# Patient Record
Sex: FEMALE | Race: WHITE | URBAN - METROPOLITAN AREA
[De-identification: names, ages, dates, MRNs, and addresses within clinical notes are randomized per-mention and may not be internally consistent; named-entity substitution may affect disease eponyms.]

---

## 2018-02-13 ENCOUNTER — OFFICE VISIT CONVERTED (OUTPATIENT)
Dept: PULMONOLOGY | Facility: CLINIC | Age: 83
End: 2018-02-13
Attending: PHYSICIAN ASSISTANT

## 2018-02-16 ENCOUNTER — OFFICE VISIT CONVERTED (OUTPATIENT)
Dept: CARDIOLOGY | Facility: CLINIC | Age: 83
End: 2018-02-16
Attending: INTERNAL MEDICINE

## 2018-03-16 ENCOUNTER — CONVERSION ENCOUNTER (OUTPATIENT)
Dept: CARDIOLOGY | Facility: CLINIC | Age: 83
End: 2018-03-16

## 2018-03-16 ENCOUNTER — OFFICE VISIT CONVERTED (OUTPATIENT)
Dept: CARDIOLOGY | Facility: CLINIC | Age: 83
End: 2018-03-16
Attending: INTERNAL MEDICINE

## 2018-04-26 ENCOUNTER — OFFICE VISIT CONVERTED (OUTPATIENT)
Dept: CARDIOLOGY | Facility: CLINIC | Age: 83
End: 2018-04-26
Attending: INTERNAL MEDICINE

## 2018-05-07 ENCOUNTER — OFFICE VISIT CONVERTED (OUTPATIENT)
Dept: PULMONOLOGY | Facility: CLINIC | Age: 83
End: 2018-05-07
Attending: INTERNAL MEDICINE

## 2021-05-16 VITALS
HEIGHT: 64 IN | SYSTOLIC BLOOD PRESSURE: 154 MMHG | WEIGHT: 124 LBS | BODY MASS INDEX: 21.17 KG/M2 | HEART RATE: 78 BPM | DIASTOLIC BLOOD PRESSURE: 60 MMHG

## 2021-05-16 VITALS
SYSTOLIC BLOOD PRESSURE: 154 MMHG | WEIGHT: 130 LBS | DIASTOLIC BLOOD PRESSURE: 74 MMHG | HEART RATE: 82 BPM | BODY MASS INDEX: 22.2 KG/M2 | HEIGHT: 64 IN

## 2021-05-16 VITALS
WEIGHT: 129 LBS | BODY MASS INDEX: 22.86 KG/M2 | SYSTOLIC BLOOD PRESSURE: 136 MMHG | HEIGHT: 63 IN | DIASTOLIC BLOOD PRESSURE: 66 MMHG | HEART RATE: 70 BPM

## 2021-05-28 VITALS
OXYGEN SATURATION: 89 % | TEMPERATURE: 97.8 F | WEIGHT: 126.31 LBS | RESPIRATION RATE: 16 BRPM | DIASTOLIC BLOOD PRESSURE: 64 MMHG | BODY MASS INDEX: 21.56 KG/M2 | SYSTOLIC BLOOD PRESSURE: 160 MMHG | HEART RATE: 90 BPM | HEIGHT: 64 IN

## 2021-05-28 VITALS
RESPIRATION RATE: 16 BRPM | DIASTOLIC BLOOD PRESSURE: 62 MMHG | HEIGHT: 63 IN | OXYGEN SATURATION: 96 % | BODY MASS INDEX: 22.68 KG/M2 | HEART RATE: 82 BPM | TEMPERATURE: 98 F | WEIGHT: 128 LBS | SYSTOLIC BLOOD PRESSURE: 175 MMHG

## 2021-05-28 NOTE — PROGRESS NOTES
Patient: CARA KEENE     Acct: [id number]     Report: #[id number]  UNIT #: [id number]     : 1934    Encounter Date:2018  PRIMARY CARE: Aakash Murphy  ***Signed***  --------------------------------------------------------------------------------------------------------------------  Chief Complaint      Encounter Date      May 7, 2018            Primary Care Provider      Aakash Murphy            Referring Provider      Екатерина Pearl            Patient Complaint      Patient is complaining of      Pt here for 2m f/u and results, pneumonia            VITALS      Height 5 ft 4 in / 162.56 cm      Weight 126 lbs 5 oz / 57.98088 kg      BSA 1.61 m2      BMI 21.7 kg/m2      Temperature 97.8 F / 36.56 C - Oral      Pulse 90      Respirations 16      Blood Pressure 160/64 Sitting, Right Arm      Pulse Oximetry 89%, Room air            HPI      The patient is a very pleasant 84 year old white female here today for follow     up.  The patient had a CT scan of his chest on 2018 that showed complete     resolution of the previously seen pneumonia.  She is doing well at this time     with complaints of no shortness of breath, not currently wearing any oxygen and     has no hemoptysis or any chest pains at this time.            ROS      Constitutional:  Complains of: Other (Pt states nothing is wrong), Denies:     Fatigue, Fever, Weight gain, Weight loss, Chills, Insomnia      Respiratory/Breathing:  Denies: Shortness of air, Wheezing, Cough, Hemoptysis,     Pleuritic pain, Other      Endocrine:  Denies: Polydipsia, Polyuria, Heat/cold intolerance, Abnorml     menstrual pattern, Diabetes, Other      Eyes:  Denies: Blurred vision, Vision Changes, Other      Ears, nose, mouth, throat:  Denies: Mouth lesions, Thrush, Throat pain,     Hoarseness, Allergies/Hay Fever, Post Nasal Drip, Headaches, Recent Head Injury    , Nose Bleeding, Neck Stiffness, Thyroid Mass, Hearing Loss, Ear Fullness, Dry      Mouth, Nasal or Sinus Pain, Dry Lips, Nasal discharge, Nasal congestion, Other      Cardiovascular:  Denies: Palpitations, Syncope, Claudication, Chest Pain, Wake     up Gasping for air, Leg Swelling, Irregular Heart Rate, Cyanosis, Dyspnea on     Exertion, Other      Gastrointestinal:  Denies: Nausea, Constipation, Diarrhea, Abdominal pain,     Vomiting, Difficulty Swallowing, Reflux/Heartburn, Dysphagia, Jaundice, Bloating    , Melena, Bloody stools, Other      Genitourinary:  Denies: Urinary frequency, Incontinence, Hematuria, Urgency,     Nocturia, Dysuria, Testicular problems, Other      Musculoskeletal:  Denies: Joint Pain, Joint Stiffness, Joint Swelling, Myalgias    , Other      Hematologic/lymphatic:  DENIES: Lymphadenopathy, Bruising, Bleeding tendencies,     Other      Neurological:  Denies: Headache, Numbness, Weakness, Seizures, Other      Psychiatric:  Denies: Anxiety, Appropriate Effect, Depression, Other      Sleep:  No: Excessive daytime sleep, Morning Headache?, Snoring, Insomnia?,     Stop breathing at sleep?, Other      Integumentary:  Denies: Rash, Dry skin, Skin Warm to Touch, Other      Immunologic/Allergic:  Denies: Latex allergy, Seasonal allergies, Asthma,     Urticaria, Eczema, Other      Immunization status:  No: Up to date            FAMILY/SOCIAL/MEDICAL HX      Surgical History:  No: Abdominal Surgery, Appendectomy, Bladder Surgery, Bowel     Surgery, CABG, Cholecystectomy, Head Surgery, Oral Surgery, Orthopedic Surgery,     Vascular Surgery      Stroke - Family Hx:  Sister      Heart - Family Hx:  Father      Diabetes - Family Hx:  Brother      Cancer/Type - Family Hx:  Mother, Sister      Is Father Still Living?:  No      Is Mother Still Living?:  No      Smoking status:  Never smoker      Anticoagulation Therapy:  Yes      Antibiotic Prophylaxis:  No      Medical History:  Yes: Congestive Heart Failu (pt states dx in ER ), Diabetes,     High Blood Pressure, Miscellaneous  Medical/oth, No: Arthritis, Asthma, Blood     Disease, Chemotherapy/Cancer, Chronic Bronchitis/COPD, Deafness or Ringing Ears    , Seizures, Heart Attack, Hemorrhoids/Rectal Prob, Shortness Of Breath      Psychiatric History      None            PREVENTION      Hx Influenza Vaccination:  Yes      Date Influenza Vaccine Given:  Jan 1, 2018      Influenza Vaccine Declined:  Yes      2 or More Falls Past Year?:  No      Fall Past Year with Injury?:  No      Hx Pneumococcal Vaccination:  Yes      Encouraged to follow-up with:  PCP regarding preventative exams.      Chart initiated by      Mellisa Greene MA            ALLERGIES/MEDICATIONS      Allergies:        Coded Allergies:             NO KNOWN DRUG ALLERGIES (Verified  Allergy, Unknown, 5/7/18)      Medications    Last Reconciled on 5/7/18 10:23 by NIMCO GREEN MD      Vancomycin HCl (Vancomycin HCl) 125 Mg Cap      125 MG PO Q6HR, #40 CAP 0 Refills         Prov: Bahman Juarez         4/8/18       Furosemide (Furosemide) 40 Mg Tablet      40 MG PO QDAY for 30 Days, #30 TAB         Prov: Екатерина Pearl         2/2/18       Lisinopril* (Lisinopril*) 20 Mg Tablet      40 MG PO QDAY for 30 Days, #60 TAB         Prov: Екатерина Pearl         2/2/18       Insulin Detemir (Levemir VIAL*) 100 Unit/1 Ml Vial      13 UNITS SUBQ HS, #1 VIAL 0 Refills         Prov: Екатерина Pearl         2/2/18       Amiodarone Hcl (Amiodarone) 200 Mg Tablet      200 MG PO QDAY, #30 TAB 5 Refills         Prov: MOY SPIVEY         1/31/18       Metoprolol Succinate (Toprol XL*) 100 Mg Tab.er.24h      100 MG PO QDAY, #30 TAB 5 Refills         Prov: MOY SPIVEY         1/31/18       Apixaban (Eliquis) 2.5 Mg Tablet      2.5 MG PO BID for 30 Days, #60 TAB 5 Refills         Prov: MOY SPIVEY         1/31/18       Repaglinide (Prandin) 2 Mg Tablet      2 MG PO BID, #60 TAB 0 Refills         Reported         1/24/18       B Complex   1 EACH PO QDAY for 30 Days, CAP         Reported         1/24/18        Multivitamin/Iron/Folic Acid (Centrum Complete Multivitamin*) 1 Tab Tablet      1 TAB PO QDAY, #30 TAB 0 Refills         Reported         1/24/18       Aspirin (Aspirin Baby *) 81 Mg Tab.chew      81 MG PO HS, #30 TAB.CHEW 0 Refills         Reported         1/24/18       Gabapentin (Neurontin) 300 Mg Capsule      300 MG PO BID, CAP 0 Refills         Reported         10/16/09       Atorvastatin Calcium (Lipitor*) 20 Mg Tablet      20 MG PO HS, TAB 0 Refills         Reported         10/14/09       amLODIPine (Norvasc) 10 Mg Tablet      10 MG PO QDAY, TAB 0 Refills         Reported         10/14/09       Metformin Hcl (Glucophage*) 500 Mg Tablet      500 TAB PO BID, 0 Refills         Reported         10/14/09       Raloxifene HCl (Evista) 60 Mg Tab      60 MG PO QDAY, TAB 0 Refills         Reported         10/14/09      Current Medications      Current Medications Reviewed 5/7/18            EXAM      GEN-patient appears stated age resting comfortable in no acute distress      Eyes-PERRL,  conjunctiva are normal in appearance extraocular muscles are intact    , no scleral icterus      Nasal-both nares are patent turbinates appear normal no polyps seen no nasal     discharge or ulcerations      Ears-tympanic membranes are normal no erythema no bulging, normal to inspection      Lymphatic-no swollen or enlarged cervical nodes, or axillary node, or femoral     nodes, or supraclavicular nodes      Mouth normal dentition, no erythema no ulcerations oropharynx appears normal no     exudate no evidence of postnasal drip, MP(default value)      Neck-there are no palpable supraclavicular or cervical adenopathy, thyroid is     normal in appearance no apparent nodules, there is no inspiratory or expiratory     stridor      Respiratory-lungs are grossly clear to auscultation bilaterally.  No wheezes,     rhonchi or crackles appreciated.  Bilateral lower extremities are warm and dry     with +1 pitting edema.         Cardiovascular-the heart rate is normal and regular S1 and S2 present with no     murmur or extra heart sounds, there is no JVD or pedal edema present      GI-the abdomen is normal in appearance, bowel sounds present and normal in all     quadrants no hepatosplenomegaly or masses felt      Extremities-no clubbing is present, pulses present in all extremities,     capillary refill time is normal      Musculoskeletal-Normal strength in upper and lower extremities, inspection     shows no evidence of muscle atrophy      Skin-skin is normal in appearance it is warm and dry, no rashes present, no     evidence of cyanosis, palpation reveals no masses      Neurological-the patient is alert and oriented to time place and person, moves     all 4 extremities, normal gait, normal affect and mood, CN2-12 intact      Psych-normal judgment and insight is good, normal mood and affect, alert and     oriented to person, place, and time, and date      Vtials      Vitals:             Height 5 ft 4 in / 162.56 cm           Weight 126 lbs 5 oz / 57.15058 kg           BSA 1.61 m2           BMI 21.7 kg/m2           Temperature 97.8 F / 36.56 C - Oral           Pulse 90           Respirations 16           Blood Pressure 160/64 Sitting, Right Arm           Pulse Oximetry 89%, Room air            REVIEW      Results Reviewed      [facility] Results Reviewed?:  Yes Prev Lab Results, Yes Prev Radiology Results, Yes     Previous [facility] Records            Assessment      ASSESSMENT:       1.  Multifocal pneumonia secondary to MSSA.      2.  Hypoxic respiratory failure, resolved.      3. Cough, resolved.            PLAN:        1.  At this time, the patient with no further pulmonary complaints.  No need     for further follow up.      2.  Told to discontinue oxygen is not already discontinued.        3.  Patient up-to-date on pneumonia vaccine.      4.  I have personally reviewed laboratory data, imaging as well as previous     medical records.             Patient Education      Education resources provided:  Yes      Other Education:  pneumonia                 Disclaimer: Converted document may not contain table formatting or lab diagrams. Please see [employer] for the authenticated document.

## 2021-05-28 NOTE — PROGRESS NOTES
Patient: SOHEILA KEENE     Acct: [id number]     Report: #[id number]  UNIT #: [id number]     : 1934    Encounter Date:2018  PRIMARY CARE: Aakash Murphy  ***Signed***  --------------------------------------------------------------------------------------------------------------------  Chief Complaint      Encounter Date      2018            Referring Provider      Екатерина Pearl            Patient Complaint      Patient is complaining of      pt here for 14 day AMIE            TRANSITION OF CARE 14D      Transition of Care      AMIE 14 Day Followup      Soheila presents to office for follow up post discharge from inpatient status     within 14 calendar days. Patient was contacted within 2 business days via phone     conversation. Documentation of that phone call is present in the patient's     electronic chart. 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            Admitting MD: Екатерина Pearl            Hers  discharge summary has been reviewed and placed in the patient's     electronic chart.            Problem List      Problem List Reviewed      Patient's problem list has been reviewed from patient discharge summary.            Medications Reviewed      Meds Reviewed      Patient s outpatient medication list has been reconciled with the medication     list  from the discharge summary and has been reviewed with the patient.            VITALS      Height 5 ft 3 in / 160.02 cm      Weight 128 lbs 0 oz / 58.153573 kg      BSA 1.61 m2      BMI 22.7 kg/m2      Temperature 98.0 F / 36.67 C - Oral      Pulse 82      Respirations 16      Blood Pressure 175/62 Sitting, Left Arm      Pulse Oximetry 96%, room air            HPI      The patient is a very pleasant 83 year old female who presents to the office     today in follow up after admission to [facility] with shortness     of breath, cough, fatigue and malaise where she was found to have an MSSA     pneumonia with suspected previous influenza with multifocal infiltrates on her     chest CT.  She had acute hypoxic respiratory failure which resolved after     treatment with breathing treatments and antibiotics and she did not require     supplemental oxygen on discharge. She reports completing her course of     antibiotics with cefadroxil as prescribed and that she has been doing quite     well since her discharge.  She currently denies any shortness of breath or     dyspnea on exertion. She denies any coughing or wheezing. She denies any     hemoptysis, fever or chills and denies weight loss.  She does still feel a     little fatigue, but is gradually gaining her energy back.  She is a never     smoker and denies any prior history of asthma/COPD or other respiratory issues.      She also has a history of diastolic congestive heart failure and does take     Lasix 40 mg po daily, but reports she has had some mild lower extremity edema     yesterday and today.  She also reports that her BP medications were adjusted     during her hospitalization and her atenolol was changed to Toprol XL and since     then her BP has been elevated in the 150s to 170s systolic which is consistent     with what it is today in the office. She is scheduled to see her cardiology,     Dr. Chu this Friday and plans to address her BP with  him at that time.              I have reviewed her ROS, medical, surgical and family history and agree with     those as entered.            ROS      Constitutional:  Complains of: Fatigue, Denies: Fever, Weight gain, Weight loss    , Chills, Insomnia, Other      Respiratory/Breathing:  Denies: Shortness of air, Wheezing, Cough, Hemoptysis,     Pleuritic pain, Other      Endocrine:  Denies: Polydipsia, Polyuria, Heat/cold intolerance, Abnorml     menstrual pattern, Diabetes, Other      Eyes:  Denies: Blurred vision, Vision Changes, Other      Ears, nose, mouth, throat:  Denies: Congestion, Dysphagia, Hearing Changes,     Nose Bleeding, Nasal Discharge, Throat pain, Tinnitus, Other      Cardiovascular:  Denies: Chest Pain, Exertional dyspnea, Peripheral Edema,     Palpitations, Syncope, Wake up Gasping for air, Orthopnea, Tachycardia, Other      Gastrointestinal:  Denies: Abdominal pain/cramping, Bloody stools, Constipation    , Diarrhea, Melena, Nausea, Vomiting, Other      Genitourinary:  Denies: Dysuria, Urinary frequency, Incontinence, Hematuria,     Urgency, Other      Musculoskeletal:  Denies: Joint Pain, Joint Stiffness, Joint Swelling, Myalgias    , Other      Hematologic/lymphatic:  DENIES: Lymphadenopathy, Bruising, Bleeding tendencies,     Other      Neurologic:  Denies: Headache, Numbness, Weakness, Seizures, Other      Psychiatric:  Denies: Anxiety, Appropriate Effect, Depression, Other      Sleep:  No: Excessive daytime sleep, Morning Headache?, Snoring, Insomnia?,     Stop breathing at sleep?, Other      Integumentary:  Denies: Rash, Dry skin, Skin Warm to Touch, Other            FAMILY/SOCIAL/MEDICAL HX      Surgical History:  No: Abdominal Surgery, Appendectomy, Bladder Surgery, Bowel     Surgery, CABG, Cholecystectomy, Head Surgery, Oral Surgery, Orthopedic Surgery,     Vascular Surgery      Stroke - Family Hx:  Sister      Heart - Family Hx:  Father      Diabetes - Family Hx:  Brother       Cancer/Type - Family Hx:  Mother, Sister      Is Father Still Living?:  No      Is Mother Still Living?:  No      Smoking status:  Never smoker      Anticoagulation Therapy:  Yes      Antibiotic Prophylaxis:  No      Medical History:  Yes: Arthritis (RIGHT HIP), Diabetes (DM II--ORAL AGENT AND     INSULIN), High Blood Pressure (MED CONTROLLED), No: Asthma, Blood Disease,     Chemotherapy/Cancer, Chronic Bronchitis/COPD, Congestive Heart Failu, Deafness     or Ringing Ears, Seizures, Heart Attack, Hemorrhoids/Rectal Prob, Shortness Of     Breath, Miscellaneous Medical/oth            Hx Influenza Vaccination:  Yes (12/2017)      Date Influenza Vaccine Given:  Jan 1, 2018      Influenza Vaccine Declined:  Yes      2 or More Falls Past Year?:  No      Fall Past Year with Injury?:  No      Hx Pneumococcal Vaccination:  Yes (2/2/18)      Chart initiated by      dangelo dorsey            ALLERGIES/MEDICATIONS      Allergies:        Coded Allergies:             NO KNOWN ALLERGIES (Verified , 2/13/18)      Medications    Last Reconciled on 2/13/18 09:21 by MAGGY WINTERS PA-C      Furosemide (Furosemide) 40 Mg Tablet      40 MG PO QDAY for 30 Days, #30 TAB         Prov: MeetЕкатерина winn         2/2/18       Lisinopril* (Lisinopril*) 20 Mg Tablet      40 MG PO QDAY for 30 Days, #60 TAB         Prov: VernaalaЕкатерина         2/2/18       Insulin Detemir (Levemir VIAL*) 100 Unit/1 Ml Vial      13 UNITS SUBQ HS, #1 VIAL 0 Refills         Prov: BadjoshuaalaЕкатерина         2/2/18       Cefadroxil (Duricef) 250 Mg/5 Ml Susp.recon      500 MG PO BID for 1 Day, #20 ML         Prov: Екатерина Pearl         2/2/18       Amiodarone Hcl (Amiodarone) 200 Mg Tablet      200 MG PO QDAY, #30 TAB 5 Refills         Prov: MOY SPIVEY         1/31/18       Metoprolol Succinate (Toprol XL*) 100 Mg Tab.er.24h      100 MG PO QDAY, #30 TAB 5 Refills         Prov: MOY SPIVEY         1/31/18       Apixaban (Eliquis) 2.5 Mg Tablet      2.5 MG PO BID for 30  Days, #60 TAB 5 Refills         Prov: MOY SPIVEY         1/31/18       Repaglinide (Prandin) 2 Mg Tablet      2 MG PO BID, #60 TAB 0 Refills         Reported         1/24/18       B Complex   1 EACH PO QDAY for 30 Days, CAP         Reported         1/24/18       Multivitamin/Iron/Folic Acid (Centrum Complete Multivitamin*) 1 Tab Tablet      1 TAB PO QDAY, #30 TAB 0 Refills         Reported         1/24/18       Aspirin (Aspirin Baby *) 81 Mg Tab.chew      81 MG PO HS, #30 TAB.CHEW 0 Refills         Reported         1/24/18       Gabapentin (Neurontin) 300 Mg Capsule      300 MG PO BID, CAP 0 Refills         Reported         10/16/09       Atorvastatin Calcium (Lipitor*) 20 Mg Tablet      1 TAB PO HS, TAB 0 Refills         Reported         10/14/09       amLODIPine (Norvasc) 10 Mg Tablet      10 MG PO QDAY, TAB 0 Refills         Reported         10/14/09       Metformin Hcl (Glucophage*) 500 Mg Tablet      1 TAB PO BID, 0 Refills         Reported         10/14/09       Raloxifene HCl (Evista) 60 Mg Tab      1 TAB PO QDAY, TAB 0 Refills         Reported         10/14/09      Current Medications      Current Medications Reviewed 2/13/18            EXAM      GEN-patient appears stated age resting comfortable in no acute distress      Eyes-PERRL,  conjunctiva are normal in appearance extraocular muscles are intact    , no scleral icterus      Nasal-both nares are patent turbinates appear normal no polyps seen no nasal     discharge or ulcerations      Ears-tympanic membranes are normal no erythema no bulging, normal to inspection      Lymphatic-no swollen or enlarged cervical nodes, or axillary node, or femoral     nodes, or supraclavicular nodes      Mouth normal dentition, no erythema no ulcerations oropharynx appears normal no     exudate no evidence of postnasal drip, MP(default value)      Neck-there are no palpable supraclavicular or cervical adenopathy, thyroid is     normal in appearance no apparent nodules, there  is no inspiratory or expiratory     stridor      Respiratory-lungs are grossly clear to auscultation bilaterally.  No wheezes,     rhonchi or crackles appreciated.  Bilateral lower extremities are warm and dry     with +1 pitting edema.        Cardiovascular-the heart rate is normal and regular S1 and S2 present with no     murmur or extra heart sounds, there is no JVD or pedal edema present      GI-the abdomen is normal in appearance, bowel sounds present and normal in all     quadrants no hepatosplenomegaly or masses felt      Extremities-no clubbing is present, pulses present in all extremities,     capillary refill time is normal      Musculoskeletal-Normal strength in upper and lower extremities, inspection     shows no evidence of muscle atrophy      Skin-skin is normal in appearance it is warm and dry, no rashes present, no     evidence of cyanosis, palpation reveals no masses      Neurological-the patient is alert and oriented to time place and person, moves     all 4 extremities, normal gait, normal affect and mood, CN2-12 intact      Psych-normal judgment and insight is good, normal mood and affect, alert and     oriented to person, place, and time, and date      Vitals      Vitals:             Height 5 ft 3 in / 160.02 cm           Weight 128 lbs 0 oz / 58.497932 kg           BSA 1.61 m2           BMI 22.7 kg/m2           Temperature 98.0 F / 36.67 C - Oral           Pulse 82           Respirations 16           Blood Pressure 175/62 Sitting, Left Arm           Pulse Oximetry 96%, room air            REVIEW      Results Reviewed      PCCS Results Reviewed?:  Yes Prev Lab Results, Yes Prev Radiology Results, Yes     [facility]      Lab Results      I have personally reviewed her hospital pulmonary consultation, progress notes     and discharge summary as well as lab work and imaging.      Radiographic Results               [facility]                 PACS RADIOLOGY REPORT            Patient: CARA KEENE   Acct: #[id number]   Report: #[id number]            UNIT #: [id number]    DOS: 18 1309      INSURANCE:[employer]   ORDER #:[id number]      LOCATION:[facility]  3017-01   : 1934            PROVIDERS      ADMITTING:  Abdi Salcido   ATTENDING: Abdi Salcido      FAMILY:  JeffreyAakash   ORDERING:  Abdi Salcido         OTHER:    DICTATING:  Oscar Chapman MD, JR            REQ #:[id number]   EXAM:W - CT CHEST with CONTRAST      REASON FOR EXAM:  hypoxic respiratory failure      REASON FOR VISIT:  PNEUMONIA, COMMUNITY ACQUIRED            *******Signed******         PROCEDURE:   CT CHEST W/ CONTRAST             COMPARISON:   [facility], , CHEST AP/PA 1 VIEW, 2018, 22:    39.             INDICATIONS:   HYPOXIC/ACUTE RESPIRATORY FAILURE.             TECHNIQUE:   After obtaining the patient's consent, CT images were obtained     with non-ionic       intravenous contrast material.               PROTOCOL:     Pulmonary embolism imaging protocol performed                RADIATION:     DLP: 461mGy*cm          Automated exposure control was utilized to minimize radiation dose.       CONTRAST:   100cc Isovue 370 I.V.      LABS:     eGFR: >60ml/min/1.73m2             FINDINGS:   No pulmonary embolism is appreciated. Bilateral infiltrates are seen    , which may represent       multifocal pneumonia. Some of the infiltrates in the lung bases appear nodular.     Consider imaging       follow-up to ensure a benign progression. Please correlate clinically. There     are small to moderate       mediastinal and hilar lymph nodes, which are nonspecific. They may be reactive     in nature. No       thoracic aortic dissection or aneurysm is suggested. There is thoracic aortic     atherosclerosis.       There is some degree of anasarca. There is mild cardiomegaly. No pleural or     pericardial effusion is        identified.             CONCLUSION:                1. No pulmonary embolism is seen.              2. There are diffuse bilateral infiltrates, which may represent multifocal     pneumonia. Consider       imaging follow-up these findings to ensure a benign progression.             SAMUEL FRANCISCO JR, MD             Electronically Signed and Approved By: SAMUEL FRANCISCO JR, MD on 1/24/2018 at 22:    05                        Until signed, this is an unconfirmed preliminary report that may contain      errors and is subject to change.                                              CARJI:      D:01/24/18 2205            PLAN      Assessment      MSSA (methicillin susceptible Staphylococcus aureus) pneumonia - J15.211       Laterality: right            Acute hypoxemic respiratory failure - J96.01            Cough - R05            Notes      New Diagnostics      * Chest W/O Cont CT, 2 Months       Dx: MSSA (methicillin susceptible Staphylococcus aureus) pneumonia - J15.211      ASSESSMENT:       1.  Multifocal MSSA pneumonia, resolving.        2.  Acute hypoxic respiratory failure, resolved.      3. Cough, resolved.      4. Chronic diastolic congestive heart failure.            PLAN:      1. At this time, I will have her repeat a chest CT in two months to ensure     resolution of her multifocal pneumonia.        2.  I have instructed her to monitor her weight daily and to minimize her salt     intake to less than 2 grams daily and to address her elevated BP with Dr. Chu     when she sees him on Friday.        3.  I have also advised her that he may recommend her to take extra Lasix for     several days if her weight is indeed increasing as she has had some mild lower     extremity edema over the past several days.        4.  I will see her back in two months after she has her follow up chest CT and     I have instructed her to call us sooner if needed.                 Disclaimer: Converted document may not contain table  formatting or lab diagrams. Please see [facility] [employer] [facility] for the authenticated document.